# Patient Record
Sex: MALE | Race: WHITE | NOT HISPANIC OR LATINO
[De-identification: names, ages, dates, MRNs, and addresses within clinical notes are randomized per-mention and may not be internally consistent; named-entity substitution may affect disease eponyms.]

---

## 2020-03-12 PROBLEM — Z00.00 ENCOUNTER FOR PREVENTIVE HEALTH EXAMINATION: Status: ACTIVE | Noted: 2020-03-12

## 2020-06-15 DIAGNOSIS — Z72.89 OTHER PROBLEMS RELATED TO LIFESTYLE: ICD-10-CM

## 2020-06-15 DIAGNOSIS — Z78.9 OTHER SPECIFIED HEALTH STATUS: ICD-10-CM

## 2020-06-15 DIAGNOSIS — Z86.79 PERSONAL HISTORY OF OTHER DISEASES OF THE CIRCULATORY SYSTEM: ICD-10-CM

## 2020-06-16 ENCOUNTER — APPOINTMENT (OUTPATIENT)
Dept: CARDIOLOGY | Facility: CLINIC | Age: 75
End: 2020-06-16
Payer: MEDICARE

## 2020-06-16 ENCOUNTER — NON-APPOINTMENT (OUTPATIENT)
Age: 75
End: 2020-06-16

## 2020-06-16 VITALS
HEART RATE: 82 BPM | WEIGHT: 190 LBS | HEIGHT: 71 IN | RESPIRATION RATE: 16 BRPM | TEMPERATURE: 98 F | DIASTOLIC BLOOD PRESSURE: 70 MMHG | OXYGEN SATURATION: 99 % | SYSTOLIC BLOOD PRESSURE: 124 MMHG | BODY MASS INDEX: 26.6 KG/M2

## 2020-06-16 PROCEDURE — 93000 ELECTROCARDIOGRAM COMPLETE: CPT

## 2020-06-16 PROCEDURE — 99203 OFFICE O/P NEW LOW 30 MIN: CPT

## 2020-06-16 NOTE — HISTORY OF PRESENT ILLNESS
[FreeTextEntry1] : The patient is seen because of hypertension.  The patient developed hypertension in his 60s.  He continues to have mild hypertension.  He believes it was from anxiety that his hypertension seems slightly worse recently.  He was started on pharmacologic therapy.  The patient is well controlled.  The patient is entirely asymptomatic.  The patient exercises up to 30 minutes at a time with out exertional symptoms.  There are no significant other related problems.

## 2020-06-16 NOTE — ASSESSMENT
[FreeTextEntry1] : Benign essential hypertension: Continue current pharmacologic therapy.  Basic metabolic panel on ACE inhibition has been arranged.\par \par The risks and benefits of further investigation have been reviewed.  Overall elected to continue current pharmacologic therapy.

## 2020-06-16 NOTE — PHYSICAL EXAM
[General Appearance - Well Developed] : well developed [Normal Appearance] : normal appearance [Well Groomed] : well groomed [General Appearance - Well Nourished] : well nourished [No Deformities] : no deformities [General Appearance - In No Acute Distress] : no acute distress [Normal Conjunctiva] : the conjunctiva exhibited no abnormalities [Eyelids - No Xanthelasma] : the eyelids demonstrated no xanthelasmas [Normal Oral Mucosa] : normal oral mucosa [No Oral Pallor] : no oral pallor [No Oral Cyanosis] : no oral cyanosis [Normal Jugular Venous V Waves Present] : normal jugular venous V waves present [Normal Jugular Venous A Waves Present] : normal jugular venous A waves present [Heart Rate And Rhythm] : heart rate and rhythm were normal [Heart Sounds] : normal S1 and S2 [No Jugular Venous Alexander A Waves] : no jugular venous alexander A waves [Murmurs] : no murmurs present [Exaggerated Use Of Accessory Muscles For Inspiration] : no accessory muscle use [Respiration, Rhythm And Depth] : normal respiratory rhythm and effort [Abdomen Tenderness] : non-tender [Abdomen Soft] : soft [Auscultation Breath Sounds / Voice Sounds] : lungs were clear to auscultation bilaterally [Abdomen Mass (___ Cm)] : no abdominal mass palpated [Abnormal Walk] : normal gait [Nail Clubbing] : no clubbing of the fingernails [Gait - Sufficient For Exercise Testing] : the gait was sufficient for exercise testing [Petechial Hemorrhages (___cm)] : no petechial hemorrhages [Cyanosis, Localized] : no localized cyanosis [Skin Color & Pigmentation] : normal skin color and pigmentation [No Venous Stasis] : no venous stasis [] : no rash [Skin Lesions] : no skin lesions [No Skin Ulcers] : no skin ulcer [No Xanthoma] : no  xanthoma was observed [Affect] : the affect was normal [Oriented To Time, Place, And Person] : oriented to person, place, and time [Mood] : the mood was normal [No Anxiety] : not feeling anxious

## 2020-09-19 ENCOUNTER — TRANSCRIPTION ENCOUNTER (OUTPATIENT)
Age: 75
End: 2020-09-19

## 2022-05-26 ENCOUNTER — APPOINTMENT (OUTPATIENT)
Dept: CARDIOLOGY | Facility: CLINIC | Age: 77
End: 2022-05-26
Payer: MEDICARE

## 2022-05-26 ENCOUNTER — NON-APPOINTMENT (OUTPATIENT)
Age: 77
End: 2022-05-26

## 2022-05-26 VITALS
WEIGHT: 185 LBS | DIASTOLIC BLOOD PRESSURE: 64 MMHG | SYSTOLIC BLOOD PRESSURE: 128 MMHG | HEART RATE: 91 BPM | BODY MASS INDEX: 25.9 KG/M2 | OXYGEN SATURATION: 95 % | HEIGHT: 71 IN | TEMPERATURE: 97.3 F

## 2022-05-26 PROCEDURE — 99213 OFFICE O/P EST LOW 20 MIN: CPT

## 2022-05-26 PROCEDURE — 93000 ELECTROCARDIOGRAM COMPLETE: CPT

## 2022-05-26 NOTE — ASSESSMENT
[FreeTextEntry1] : The patient is entirely asymptomatic.  He exercised for 2030 minutes without exertional symptoms.\par \par Hypertension: Well-controlled.  Need for basic metabolic panel has been reviewed.  The patient verbalizes an understanding.  Lisinopril renewed.

## 2022-08-25 ENCOUNTER — NON-APPOINTMENT (OUTPATIENT)
Age: 77
End: 2022-08-25

## 2022-09-16 ENCOUNTER — APPOINTMENT (OUTPATIENT)
Dept: CARDIOLOGY | Facility: CLINIC | Age: 77
End: 2022-09-16

## 2022-09-16 ENCOUNTER — NON-APPOINTMENT (OUTPATIENT)
Age: 77
End: 2022-09-16

## 2022-09-16 VITALS
HEIGHT: 71 IN | WEIGHT: 176 LBS | BODY MASS INDEX: 24.64 KG/M2 | DIASTOLIC BLOOD PRESSURE: 72 MMHG | HEART RATE: 133 BPM | SYSTOLIC BLOOD PRESSURE: 114 MMHG | OXYGEN SATURATION: 99 %

## 2022-09-16 VITALS — SYSTOLIC BLOOD PRESSURE: 118 MMHG | DIASTOLIC BLOOD PRESSURE: 72 MMHG

## 2022-09-16 PROCEDURE — 99215 OFFICE O/P EST HI 40 MIN: CPT

## 2022-09-16 PROCEDURE — 93000 ELECTROCARDIOGRAM COMPLETE: CPT

## 2022-09-16 RX ORDER — METOPROLOL TARTRATE 50 MG/1
50 TABLET, FILM COATED ORAL DAILY
Refills: 0 | Status: DISCONTINUED | COMMUNITY
Start: 2022-09-16 | End: 2022-09-16

## 2022-09-16 RX ORDER — LISINOPRIL 5 MG/1
5 TABLET ORAL DAILY
Qty: 90 | Refills: 0 | Status: COMPLETED | COMMUNITY
Start: 1900-01-01 | End: 2022-09-16

## 2022-09-16 RX ORDER — DIGOXIN 250 UG/1
250 TABLET ORAL DAILY
Qty: 30 | Refills: 1 | Status: DISCONTINUED | COMMUNITY
Start: 2022-09-16 | End: 2022-09-16

## 2022-09-16 NOTE — HISTORY OF PRESENT ILLNESS
[FreeTextEntry1] : Medical history mainly significant for\par Essential hypertension\par Atrial flutter.  S/p INDIRA guided ablation.  August 26, 2020

## 2022-09-16 NOTE — DISCUSSION/SUMMARY
[FreeTextEntry1] : 76-year-old gentleman with above medical history active medical problems which includes\par 1.  Recurrent atrial flutter with rapid ventricular rate after recent INDIRA guided cardioversion on August 26, 2022 hospital admission.  He is [id number] 2 score is 3.\par Pathophysiology of atrial flutter/fibrillation reviewed with them.\par Risk benefits alternatives of anticoagulation discussed.\par Complicating factor is renal stent and renal stone disease with needs intervention.\par He also has elevated BNP level and moderate dilated left atrium.\par Reviewed role of antiarrhythmic medication at present for better control of rhythm and subsequent consideration regarding atrial flutter/fibrillation ablation.\par Rhythm control versus rate control strategy and anticoagulation management of at least 4 weeks after rhythm control prior to discontinuation for short time of anticoagulation for renal intervention was discussed.\par At present options of hospital admission and urgent management reviewed.  He does not want to pursue.\par Recommended amiodarone to be started 200 mg twice daily.  Risk benefits alternatives side effects reviewed.  Close follow-up on ophthalmology, pulmonary function test, thyroid liver if chronic and long-term use.\par Metoprolol 75 mg twice daily.\par Continue Eliquis.\par Plan INDIRA guided cardioversion in next 5 to 7 days.\par Followed by 4 weeks of anticoagulation and subsequent renal stent procedure.\par At the time we can hold anticoagulation for short period of time.  As needed use of low molecular weight heparin.\par And subsequent atrial flutter ablation.\par Any change in clinical status they understand to call 911 and go to the nearest emergency room\par Risk benefits alternatives side effects limitation of all these options were reviewed.\par Understands best option would be to be admitted this week and and further management.\par #2 essential hypertension.  Very well controlled.  Continue present regimen medication\par 3.  Elevated BNP.  Trivial pericardial effusion.  Moderate mitral regurgitation.  Wall motion abnormality.  Once stable consider ischemic evaluation.\par At present no signs of unstable CAD.\par \par Counseling regarding low saturated fat, salt and carbohydrate intake was reviewed. Active lifestyle and regular. Exercise along with weight management is advised.\par All the above were at length reviewed. Answered all the questions. Thank you very much for this kind referral. Please do not hesitate to give me a call for any question.\par Part of this transcription was done with voice recognition software and phonetically similar errors are common. I apologize for that. Please do not hesitate to call for any questions due to above.\par \par Sincerely,\par Karolina Gonsales MD,FACC,FASE\par

## 2022-09-16 NOTE — ASSESSMENT
[FreeTextEntry1] : Reviewed on September 16, 2022\par EKG echocardiogram INDIRA labs were reviewed.\par TSH was 2.14\par N-terminal proBNP was 4862.  Troponins were negative CBC BMP stable though the creatinine 1.53 with GFR around 47

## 2022-09-16 NOTE — REASON FOR VISIT
[Symptom and Test Evaluation] : symptom and test evaluation [Arrhythmia/ECG Abnorrmalities] : arrhythmia/ECG abnormalities [Hypertension] : hypertension [Spouse] : spouse [FreeTextEntry3] : Dr. Gamez [FreeTextEntry1] : 76-year-old gentleman is referred to me for hospital follow-up after recent extensive stay at [facility].  August 26, 2022.  I have reviewed data which includes cardiology consultation echocardiogram EKGs labs.\par He was admitted with atrial flutter rapid ventricular rate.  He had a transesophageal echo guided cardioversion.  \par Subsequently seen in your office again with atrial flutter recurrence.  Now comes in for further evaluation management.  He has no symptoms.\par His main concern is renal stone disease recent renal stent.  Intermittent symptoms related to that.  Being followed with urologist.\par I recommended discussion with the urologist regarding further management.\par

## 2022-09-16 NOTE — CARDIOLOGY SUMMARY
[de-identified] : May 26, 2022.  Normal sinus rhythm\par August 26, 2022.  Atrial flutter.  221 block.  August 28, 2022 normal sinus rhythm.  Left atrial abnormality. [de-identified] : August 27, 2022.  LVEF 55%.  Segmental wall motion abnormality.  Moderate LAE.  Dilated ascending aorta 4.1 cm mild to moderate AR mild to moderate mitral regurgitation.  Trivial circumferential pericardial effusion.\par INDIRA.  August 26, 2022.  Moderately dilated left atrium.  Moderate MR.  LVEF 55%

## 2022-09-19 ENCOUNTER — NON-APPOINTMENT (OUTPATIENT)
Age: 77
End: 2022-09-19

## 2022-09-23 LAB — SARS-COV-2 N GENE NPH QL NAA+PROBE: NOT DETECTED

## 2022-09-26 ENCOUNTER — APPOINTMENT (OUTPATIENT)
Dept: CARDIOLOGY | Facility: CLINIC | Age: 77
End: 2022-09-26

## 2022-09-26 ENCOUNTER — NON-APPOINTMENT (OUTPATIENT)
Age: 77
End: 2022-09-26

## 2022-09-26 VITALS
HEIGHT: 71 IN | OXYGEN SATURATION: 98 % | HEART RATE: 75 BPM | DIASTOLIC BLOOD PRESSURE: 62 MMHG | SYSTOLIC BLOOD PRESSURE: 104 MMHG | BODY MASS INDEX: 24.92 KG/M2 | WEIGHT: 178 LBS

## 2022-09-26 DIAGNOSIS — Z87.442 PERSONAL HISTORY OF URINARY CALCULI: ICD-10-CM

## 2022-09-26 PROCEDURE — 99214 OFFICE O/P EST MOD 30 MIN: CPT

## 2022-09-26 PROCEDURE — 93000 ELECTROCARDIOGRAM COMPLETE: CPT

## 2022-09-26 NOTE — DISCUSSION/SUMMARY
[FreeTextEntry1] : 76-year-old gentleman with above medical history active medical problems which includes\par 1.  Paroxysmal atrial flutter with rapid ventricular rate after recent INDIRA guided cardioversion on August 26, 2022 hospital admission.  He is [id number] 2 score is 3.\par Pathophysiology of atrial flutter/fibrillation reviewed with them.\par Risk benefits alternatives of anticoagulation discussed.\par He also has elevated BNP level and moderate dilated left atrium.\par Now back in normal sinus rhythm.\par Decrease amiodarone to 200 mg.  Follow liver function thyroid panel ophthalmic checkup PFTs regularly when amiodarone is continued in future.\par Decrease metoprolol to 50 mg twice daily\par Reviewed rhythm control with atrial flutter ablation going forward.\par He will make up his mind regarding which electrophysiologist he wants to see as he has been talking to his friends and family.  I have given him options at [facility] cardiology practice locally\par Continue Eliquis.\par Recommended to see urologist at this point.  Plan for renal intervention in about 3 weeks.  At that time anticoagulation can be stopped for 48 hours.  And restart as soon as possible after the intervention.  Followed by consideration regarding ablation after at least 4 weeks of anticoagulation\par Any change in clinical status they understand to call 911 and go to the nearest emergency room\par Risk benefits alternatives side effects limitation of all these options were reviewed.\par Understands best option would be to be admitted this week and and further management.\par #2 essential hypertension.  Very well controlled.  Continue present regimen medication\par 3.  Elevated BNP.  Trivial pericardial effusion.  Moderate mitral regurgitation.  Wall motion abnormality. \par Ischemic evaluation with nuclear myocardial perfusion scan.  Risk benefits alternatives reviewed.  Technetium 99 radioisotope use was reviewed.  Risks, benefits, alternatives of use of radioisotope was discussed at length.  Patient verbalized understanding and agreed with the management plan.\par If unable to achieve the goal will need pharmacological means\par \par Counseling regarding low saturated fat, salt and carbohydrate intake was reviewed. Active lifestyle and regular. Exercise along with weight management is advised.\par All the above were at length reviewed. Answered all the questions. Thank you very much for this kind referral. Please do not hesitate to give me a call for any question.\par Part of this transcription was done with voice recognition software and phonetically similar errors are common. I apologize for that. Please do not hesitate to call for any questions due to above.\par \par Sincerely,\par Karolina Gonsales MD,FACC,FASE\par

## 2022-09-26 NOTE — CARDIOLOGY SUMMARY
[de-identified] : May 26, 2022.  Normal sinus rhythm\par August 26, 2022.  Atrial flutter.  221 block.  August 28, 2022 normal sinus rhythm.  Left atrial abnormality.\par September 26, 2022.  Normal sinus [de-identified] : August 27, 2022.  LVEF 55%.  Segmental wall motion abnormality.  Moderate LAE.  Dilated ascending aorta 4.1 cm mild to moderate AR mild to moderate mitral regurgitation.  Trivial circumferential pericardial effusion.\par INDIRA.  August 26, 2022.  Moderately dilated left atrium.  Moderate MR.  LVEF 55%

## 2022-09-26 NOTE — REASON FOR VISIT
[Symptom and Test Evaluation] : symptom and test evaluation [Arrhythmia/ECG Abnorrmalities] : arrhythmia/ECG abnormalities [Hypertension] : hypertension [Spouse] : spouse [FreeTextEntry3] : Dr. Gamez [FreeTextEntry1] : 76-year-old gentleman comes in with his wife for follow-up consultation after recent hospital admission for INDIRA/cardioversion.  He was noted in normal sinus rhythm prior to the procedure and procedure was canceled.  He has been maintained on amiodarone metoprolol Eliquis.\par As you know recently he was admitted to [facility] in August\par with atrial flutter rapid ventricular rate.  He had a transesophageal echo guided cardioversion.  \par Subsequently seen in your office again with atrial flutter recurrence.  Now comes in for further evaluation management.  He has no symptoms.\par His main concern is renal stone disease recent renal stent.  Intermittent symptoms related to that.  Being followed with urologist.\par He is here now to plan for urological procedure and further management of his atrial flutter\par

## 2022-09-26 NOTE — ASSESSMENT
[FreeTextEntry1] : Reviewed on September 16, 2022\par EKG echocardiogram INDIRA labs were reviewed.\par TSH was 2.14\par N-terminal proBNP was 4862.  Troponins were negative CBC BMP stable though the creatinine 1.53 with GFR around 47\par \par Reviewed on September 26, 2022.\par EKG as noted above

## 2022-09-29 ENCOUNTER — APPOINTMENT (OUTPATIENT)
Dept: CARDIOLOGY | Facility: CLINIC | Age: 77
End: 2022-09-29

## 2022-09-29 ENCOUNTER — NON-APPOINTMENT (OUTPATIENT)
Age: 77
End: 2022-09-29

## 2022-09-29 PROCEDURE — 78452 HT MUSCLE IMAGE SPECT MULT: CPT

## 2022-09-29 PROCEDURE — 93015 CV STRESS TEST SUPVJ I&R: CPT

## 2022-09-29 PROCEDURE — A9502: CPT

## 2022-10-03 ENCOUNTER — NON-APPOINTMENT (OUTPATIENT)
Age: 77
End: 2022-10-03

## 2022-10-03 ENCOUNTER — APPOINTMENT (OUTPATIENT)
Dept: CARDIOLOGY | Facility: CLINIC | Age: 77
End: 2022-10-03

## 2022-10-11 ENCOUNTER — NON-APPOINTMENT (OUTPATIENT)
Age: 77
End: 2022-10-11

## 2022-10-11 ENCOUNTER — APPOINTMENT (OUTPATIENT)
Dept: CARDIOLOGY | Facility: CLINIC | Age: 77
End: 2022-10-11

## 2022-10-11 VITALS
BODY MASS INDEX: 25.62 KG/M2 | HEIGHT: 71 IN | SYSTOLIC BLOOD PRESSURE: 124 MMHG | DIASTOLIC BLOOD PRESSURE: 60 MMHG | WEIGHT: 183 LBS | OXYGEN SATURATION: 100 % | HEART RATE: 71 BPM

## 2022-10-11 DIAGNOSIS — R00.0 TACHYCARDIA, UNSPECIFIED: ICD-10-CM

## 2022-10-11 DIAGNOSIS — Z01.810 ENCOUNTER FOR PREPROCEDURAL CARDIOVASCULAR EXAMINATION: ICD-10-CM

## 2022-10-11 PROCEDURE — 93000 ELECTROCARDIOGRAM COMPLETE: CPT | Mod: NC

## 2022-10-11 PROCEDURE — 99215 OFFICE O/P EST HI 40 MIN: CPT

## 2022-10-11 RX ORDER — PANTOPRAZOLE 40 MG/1
40 TABLET, DELAYED RELEASE ORAL DAILY
Refills: 0 | Status: DISCONTINUED | COMMUNITY
Start: 2022-09-16 | End: 2022-10-11

## 2022-10-11 NOTE — DISCUSSION/SUMMARY
[FreeTextEntry1] : 76-year-old gentleman with above medical history active medical problems which includes\par 1.  Paroxysmal atrial flutter/fibrillation.  Rhythm control with INDIRA/DCCV followed by recurrence now on low-dose amiodarone remaining in normal sinus rhythm.  [id number] 2 score is 3.\par Pathophysiology of atrial flutter/fibrillation reviewed with them.\par Risk benefits alternatives of anticoagulation discussed.\par Plan rhythm control once renal stone disease management is done.\par Risk benefits alternatives of amiodarone reviewed.  High risk medication in the form of amiodarone and anticoagulation understood.  Follow ophthalmology, liver function, PFTs, TSH on a regular basis if use long-term.  Any side effects he will contact us\par Follow-up for any unusual bleeding.\par Rhythm control with electrophysiologist at [facility]/ Dr. Hendrickson after renal intervention and going back on anticoagulation along with stopping amiodarone few weeks before intervention\par #2 essential hypertension.  Very well controlled.  Continue present regimen medication\par 3.  Elevated BNP.  Trivial pericardial effusion.  Moderate mitral regurgitation.  Wall motion abnormality. \par Stable myocardial perfusion scan reviewed.  We will follow-up after rhythm control.\par 4.  Preoperative cardiac assessment for urological procedure.\par At present, there are no active cardiac conditions.\par No recent unstable coronary syndrome, decompensated heart failure, severe valvular heart disease or significant dysrhythmias.\par The clinical benefit of the proposed procedure outweighs the associated cardiovascular risk.\par Risk not attenuated with further cardiovascular testing.\par Prior testing as outlined above.\par Optimized from a cardiovascular perspective.\par Control blood pressure, heart rate, pulse oximetry perioperatively.\par If required appropriate intravenous medication for the outpatient oral medication for blood pressure, heart rate control.\par DVT prophylaxis as per indication.\par \par For surgery and invasive procedures, recommend to discontinue apixaban at least 48 hours prior to elective surgery or invasive procedures with a moderate-to-high risk of clinically significant bleeding. Anticoagulation bridging during the 48 hours apixaban is interrupted and prior to the intervention is not generally required. Reinitiate apixaban when adequate hemostasis has been achieved.  If oral therapy cannot be administered, then consider administration of a parenteral anticoagulant. Note excess discontinuation of any oral anticoagulant, including apixaban, increases the risk of thrombotic events. Patient was counseled and verbalizes understanding of these associated risks\par \par \par Counseling regarding low saturated fat, salt and carbohydrate intake was reviewed. Active lifestyle and regular. Exercise along with weight management is advised.\par All the above were at length reviewed. Answered all the questions. Thank you very much for this kind referral. Please do not hesitate to give me a call for any question.\par Part of this transcription was done with voice recognition software and phonetically similar errors are common. I apologize for that. Please do not hesitate to call for any questions due to above.\par \par Sincerely,\par Karolina Gonsales MD,FACC,FASE\par

## 2022-10-11 NOTE — ASSESSMENT
[FreeTextEntry1] : Reviewed on September 16, 2022\par EKG echocardiogram INDIRA labs were reviewed.\par TSH was 2.14\par N-terminal proBNP was 4862.  Troponins were negative CBC BMP stable though the creatinine 1.53 with GFR around 47\par \par Reviewed on September 26, 2022.\par EKG as noted above\par \par Reviewed on October 11, 2022\par Nuclear myocardial perfusion scan/EKG reviewed with them.

## 2022-10-11 NOTE — REASON FOR VISIT
[Symptom and Test Evaluation] : symptom and test evaluation [Arrhythmia/ECG Abnorrmalities] : arrhythmia/ECG abnormalities [Hypertension] : hypertension [Spouse] : spouse [FreeTextEntry3] : Dr. Gamez [FreeTextEntry1] : 76-year-old gentleman comes in with his wife for preoperative cardiac assessment, review of multiple cardiovascular tests.  I had discussion with his electrophysiologist and urologist to set up management of renal stone disease ureteric stent and subsequent A. fib/a flutter rhythm management.\par He has been maintained in normal sinus rhythm with amiodarone metoprolol Eliquis.\par As you know recently he was admitted to [facility] in August\par with atrial flutter rapid ventricular rate.  He had a transesophageal echo guided cardioversion.  \par Subsequently seen in your office again with atrial flutter recurrence.  Subsequently starting on amiodarone he has remained in normal sinus rhythm except during stress test he went into atrial fibrillation.\par At present he offers no chest pain no significant shortness of breath PND orthopnea palpitation dizziness lightheadedness near syncopal syncopal event\par No bleeding.\par Good activity and exercise\par

## 2022-10-11 NOTE — PHYSICAL EXAM
[Normal] : normal gait [Normal Gait] : normal gait [No Edema] : no edema [No Cyanosis] : no cyanosis [No Clubbing] : no clubbing [Normal Radial B/L] : normal radial B/L [de-identified] : Regular S1-S2 no significant murmur gallop or rub. [de-identified] : Warm extremities

## 2022-10-11 NOTE — CARDIOLOGY SUMMARY
[de-identified] : May 26, 2022.  Normal sinus rhythm\par August 26, 2022.  Atrial flutter.  221 block.  August 28, 2022 normal sinus rhythm.  Left atrial abnormality.\par September 26, 2022.  Normal sinus\par October 11, 2022 normal sinus rhythm normal intervals [de-identified] : Nuclear myocardial perfusion scan September 29, 2022.  5 minutes 33 seconds of Andrew protocol 7 METs of workload.  Atrial fibrillation with RVR at the peak.  Perfusion scan without any evidence of ischemia or infarction [de-identified] : August 27, 2022.  LVEF 55%.  Segmental wall motion abnormality.  Moderate LAE.  Dilated ascending aorta 4.1 cm mild to moderate AR mild to moderate mitral regurgitation.  Trivial circumferential pericardial effusion.\par INDIRA.  August 26, 2022.  Moderately dilated left atrium.  Moderate MR.  LVEF 55%

## 2022-10-17 ENCOUNTER — RX RENEWAL (OUTPATIENT)
Age: 77
End: 2022-10-17

## 2022-11-03 ENCOUNTER — RX RENEWAL (OUTPATIENT)
Age: 77
End: 2022-11-03

## 2022-11-28 ENCOUNTER — APPOINTMENT (OUTPATIENT)
Dept: CARDIOLOGY | Facility: CLINIC | Age: 77
End: 2022-11-28
Payer: MEDICARE

## 2022-11-28 VITALS
BODY MASS INDEX: 25.9 KG/M2 | HEART RATE: 75 BPM | SYSTOLIC BLOOD PRESSURE: 128 MMHG | WEIGHT: 185 LBS | RESPIRATION RATE: 14 BRPM | TEMPERATURE: 97.7 F | HEIGHT: 71 IN | OXYGEN SATURATION: 99 % | DIASTOLIC BLOOD PRESSURE: 66 MMHG

## 2022-11-28 DIAGNOSIS — I48.92 UNSPECIFIED ATRIAL FLUTTER: ICD-10-CM

## 2022-11-28 PROCEDURE — 99214 OFFICE O/P EST MOD 30 MIN: CPT

## 2022-11-28 RX ORDER — METOPROLOL TARTRATE 75 MG/1
75 TABLET, FILM COATED ORAL
Qty: 180 | Refills: 0 | Status: DISCONTINUED | COMMUNITY
Start: 2022-09-16 | End: 2022-11-28

## 2022-11-28 RX ORDER — AMIODARONE HYDROCHLORIDE 200 MG/1
200 TABLET ORAL
Qty: 90 | Refills: 0 | Status: DISCONTINUED | COMMUNITY
Start: 2022-09-16 | End: 2022-11-28

## 2022-11-28 NOTE — DISCUSSION/SUMMARY
[FreeTextEntry1] : 76-year-old gentleman with above medical history active medical problems which includes\par 1.  Paroxysmal atrial flutter/fibrillation.  Rhythm control with INDIRA/DCCV followed by recurrence now on low-dose amiodarone remaining in normal sinus rhythm.  [id number] 2 score is 3.\par Pathophysiology of atrial flutter/fibrillation reviewed with them.\par Risk benefits alternatives of anticoagulation discussed.\par Plan rhythm control once renal stone disease management is done.  No need to adjust sotalol based on renal function.  Repeat labs as ordered to be done.\par Anemia evaluation management also needs to be done.  There is no overt sign of bleeding\par Follow-up for any unusual bleeding.\par #2 essential hypertension.  Very well controlled.  Continue present regimen medication\par 3.  Elevated BNP.  Trivial pericardial effusion.  Moderate mitral regurgitation.  Wall motion abnormality. \par Stable myocardial perfusion scan reviewed.  We will follow-up after rhythm control.\par #4 high risk medication use.  Careful and close follow-up in presence of anemia or renal insufficiency.  EKG check as advised by Dr. Hendrickson on sotalol.\par Adjustment of the sotalol dose based on renal function\par \par Counseling regarding low saturated fat, salt and carbohydrate intake was reviewed. Active lifestyle and regular. Exercise along with weight management is advised.\par All the above were at length reviewed. Answered all the questions. Thank you very much for this kind referral. Please do not hesitate to give me a call for any question.\par Part of this transcription was done with voice recognition software and phonetically similar errors are common. I apologize for that. Please do not hesitate to call for any questions due to above.\par \par Sincerely,\par Karolina Gonsales MD,FACC,LATOYA\par

## 2022-11-28 NOTE — ASSESSMENT
[FreeTextEntry1] : Reviewed on September 16, 2022\par EKG echocardiogram INDIRA labs were reviewed.\par TSH was 2.14\par N-terminal proBNP was 4862.  Troponins were negative CBC BMP stable though the creatinine 1.53 with GFR around 47\par \par Reviewed on September 26, 2022.\par EKG as noted above\par \par Reviewed on October 11, 2022\par Nuclear myocardial perfusion scan/EKG reviewed with them.\par \par Reviewed November 28, 2022.\par Recent labs from November had shown hemoglobin 8.7 sodium 137 potassium 4.7 creatinine 2.73

## 2022-11-28 NOTE — REASON FOR VISIT
[Symptom and Test Evaluation] : symptom and test evaluation [Arrhythmia/ECG Abnorrmalities] : arrhythmia/ECG abnormalities [Hypertension] : hypertension [Spouse] : spouse [FreeTextEntry3] : Dr. Gamez [FreeTextEntry1] : 76-year-old gentleman comes in with his wife for preoperative cardiac assessment, review of multiple cardiovascular tests.  I had discussion with his electrophysiologist and urologist to set up management of renal stone disease ureteric stent and subsequent A. fib/a flutter rhythm management.\par He has been maintained in normal sinus rhythm with amiodarone metoprolol Eliquis.\par As you know recently he was admitted to [facility] in August\par with atrial flutter rapid ventricular rate.  He had a transesophageal echo guided cardioversion.  \par Subsequently seen in your office again with atrial flutter recurrence.  Subsequently starting on amiodarone he has remained in normal sinus rhythm except during stress test he went into atrial fibrillation.\par Subsequently it was decided to continue with amiodarone low dose.  He was seen by electrophysiologist.  Now to be started on sotalol.\par He also had his renal stone management and retrieval of the stent.\par He was noted to have renal insufficiency and anemia around the time of procedure.\par \par At present he offers no chest pain no significant shortness of breath PND orthopnea palpitation dizziness lightheadedness near syncopal syncopal event\par No bleeding.\par Good activity and exercise\par

## 2022-11-28 NOTE — CARDIOLOGY SUMMARY
[de-identified] : May 26, 2022.  Normal sinus rhythm\par August 26, 2022.  Atrial flutter.  221 block.  August 28, 2022 normal sinus rhythm.  Left atrial abnormality.\par September 26, 2022.  Normal sinus\par October 11, 2022 normal sinus rhythm normal intervals [de-identified] : Nuclear myocardial perfusion scan September 29, 2022.  5 minutes 33 seconds of Andrew protocol 7 METs of workload.  Atrial fibrillation with RVR at the peak.  Perfusion scan without any evidence of ischemia or infarction [de-identified] : August 27, 2022.  LVEF 55%.  Segmental wall motion abnormality.  Moderate LAE.  Dilated ascending aorta 4.1 cm mild to moderate AR mild to moderate mitral regurgitation.  Trivial circumferential pericardial effusion.\par INDIRA.  August 26, 2022.  Moderately dilated left atrium.  Moderate MR.  LVEF 55%

## 2022-11-28 NOTE — PHYSICAL EXAM
[Normal] : normal gait [Normal Gait] : normal gait [No Edema] : no edema [No Cyanosis] : no cyanosis [No Clubbing] : no clubbing [Normal Radial B/L] : normal radial B/L [de-identified] : Regular S1-S2 no significant murmur gallop or rub. [de-identified] : Warm extremities

## 2022-12-01 DIAGNOSIS — D64.9 ANEMIA, UNSPECIFIED: ICD-10-CM

## 2022-12-04 ENCOUNTER — RESULT CHARGE (OUTPATIENT)
Age: 77
End: 2022-12-04

## 2022-12-05 ENCOUNTER — APPOINTMENT (OUTPATIENT)
Dept: CARDIOLOGY | Facility: CLINIC | Age: 77
End: 2022-12-05

## 2022-12-05 ENCOUNTER — NON-APPOINTMENT (OUTPATIENT)
Age: 77
End: 2022-12-05

## 2022-12-05 VITALS
DIASTOLIC BLOOD PRESSURE: 70 MMHG | OXYGEN SATURATION: 99 % | WEIGHT: 184 LBS | TEMPERATURE: 97.8 F | SYSTOLIC BLOOD PRESSURE: 140 MMHG | HEART RATE: 68 BPM | BODY MASS INDEX: 25.66 KG/M2

## 2022-12-05 PROCEDURE — 99214 OFFICE O/P EST MOD 30 MIN: CPT

## 2022-12-05 PROCEDURE — 93000 ELECTROCARDIOGRAM COMPLETE: CPT

## 2022-12-05 NOTE — HISTORY OF PRESENT ILLNESS
[FreeTextEntry1] : GET TRAN  is a 77 year M  who presents today Dec 05, 2022 in clinical follow-up. \par Overall he has been feeling well. There has been no recent illness or hospital stay. Medications have remained unchanged. Asymptomatic from cardiovascular and arrhythmia standpoint. \par Following with Dr. Haas from EP standpoint. Scheduled to start Sotalolol tomorrow although due to renal insufficiency recommend following with Dr. Haas prior to starting. I have contacted his office and left a message. \par Maintaining NSR by EKG today. \par \par Today he denies chest pain, pressure, unusual shortness of breath, lightheadedness, dizziness, near syncope or syncope. \par \par Medical history mainly significant for\par Essential hypertension\par Atrial flutter.  S/p INDIRA guided ablation.  August 26, 2020

## 2022-12-05 NOTE — REASON FOR VISIT
[Symptom and Test Evaluation] : symptom and test evaluation [Arrhythmia/ECG Abnorrmalities] : arrhythmia/ECG abnormalities [Hypertension] : hypertension [Spouse] : spouse [FreeTextEntry3] : Dr. Gamez [FreeTextEntry1] : \par

## 2022-12-05 NOTE — PHYSICAL EXAM
[Normal] : normal gait [Normal Gait] : normal gait [No Edema] : no edema [No Cyanosis] : no cyanosis [No Clubbing] : no clubbing [Normal Radial B/L] : normal radial B/L [de-identified] : Regular S1-S2 no significant murmur gallop or rub. [de-identified] : Warm extremities

## 2022-12-05 NOTE — DISCUSSION/SUMMARY
[FreeTextEntry1] : GET TRAN  is a 77 year M  who presents today Dec 05, 2022 with the above history and the following active issues. \par \par Paroxysmal atrial flutter/fibrillation.   [id number] 2 score is 3.\par Pathophysiology of atrial flutter/fibrillation reviewed with them.\par Risk benefits alternatives of anticoagulation discussed.\par Anemia evaluation management also needs to be done.\par Renal insufficiency. Following with Dr. Haas regarding upcoming plan. \par \par Hypertension.  Very well controlled.  Continue present regimen medication\par \par Elevated BNP.  Trivial pericardial effusion.  Moderate mitral regurgitation.  Wall motion abnormality. \par Stable myocardial perfusion scan reviewed.  We will follow-up after rhythm control.\par \par Red flag symptoms which would warrant sooner emergent evaluation reviewed with the patient. \par Questions and concerns were addressed and answered.\par Limitations of non-invasive testing reviewed\par \par Sincerely,\par \par Jamaica Duron PA-C\par Patients history, testing and plan reviewed with supervising MD: Dr. Karolina Gonsales

## 2022-12-05 NOTE — CARDIOLOGY SUMMARY
[de-identified] : May 26, 2022.  Normal sinus rhythm\par August 26, 2022.  Atrial flutter.  221 block.  August 28, 2022 normal sinus rhythm.  Left atrial abnormality.\par September 26, 2022.  Normal sinus\par October 11, 2022 normal sinus rhythm normal intervals [de-identified] : Nuclear myocardial perfusion scan September 29, 2022.  5 minutes 33 seconds of Andrew protocol 7 METs of workload.  Atrial fibrillation with RVR at the peak.  Perfusion scan without any evidence of ischemia or infarction [de-identified] : August 27, 2022.  LVEF 55%.  Segmental wall motion abnormality.  Moderate LAE.  Dilated ascending aorta 4.1 cm mild to moderate AR mild to moderate mitral regurgitation.  Trivial circumferential pericardial effusion.\par INDIRA.  August 26, 2022.  Moderately dilated left atrium.  Moderate MR.  LVEF 55%

## 2022-12-07 RX ORDER — SOTALOL HYDROCHLORIDE 80 MG/1
80 TABLET ORAL
Refills: 0 | Status: DISCONTINUED | COMMUNITY
Start: 2022-11-21 | End: 2022-12-07

## 2023-05-08 ENCOUNTER — APPOINTMENT (OUTPATIENT)
Dept: CARDIOLOGY | Facility: CLINIC | Age: 78
End: 2023-05-08
Payer: MEDICARE

## 2023-05-08 VITALS
HEART RATE: 88 BPM | WEIGHT: 186 LBS | DIASTOLIC BLOOD PRESSURE: 78 MMHG | BODY MASS INDEX: 26.04 KG/M2 | HEIGHT: 71 IN | SYSTOLIC BLOOD PRESSURE: 140 MMHG | OXYGEN SATURATION: 97 %

## 2023-05-08 DIAGNOSIS — I49.9 CARDIAC ARRHYTHMIA, UNSPECIFIED: ICD-10-CM

## 2023-05-08 PROCEDURE — 99214 OFFICE O/P EST MOD 30 MIN: CPT

## 2023-05-08 RX ORDER — CHLORHEXIDINE GLUCONATE 4 %
325 (65 FE) LIQUID (ML) TOPICAL
Refills: 0 | Status: DISCONTINUED | COMMUNITY
Start: 2022-09-16 | End: 2023-05-08

## 2023-05-08 RX ORDER — TAMSULOSIN HYDROCHLORIDE 0.4 MG/1
0.4 CAPSULE ORAL
Refills: 0 | Status: DISCONTINUED | COMMUNITY
Start: 2022-09-16 | End: 2023-05-08

## 2023-05-08 NOTE — PHYSICAL EXAM
[Normal] : normal gait [Normal Gait] : normal gait [No Edema] : no edema [No Cyanosis] : no cyanosis [No Clubbing] : no clubbing [Normal Radial B/L] : normal radial B/L [de-identified] : Regular S1-S2 no significant murmur gallop or rub. [de-identified] : Warm extremities

## 2023-05-08 NOTE — HISTORY OF PRESENT ILLNESS
[FreeTextEntry1] : GET TRAN  is a 77 year M  who presents today May 08, 2023 \par Overall he has been feeling well. There has been no recent illness or hospital stay. Medications have remained unchanged. Asymptomatic from cardiovascular and arrhythmia standpoint. \par Following with Dr. Haas from EP standpoint. \par Following with Dr. Kramer from nephrology. \par He had a JORGE after stopping Amiodarone and was not started on Sotalol due renal insufficiency. \par \par Today he denies chest pain, pressure, unusual shortness of breath, lightheadedness, dizziness, near syncope or syncope. \par \par Medical history mainly significant for\par Essential hypertension\par Atrial flutter.  S/p INDIRA guided ablation.  August 26, 2020

## 2023-05-08 NOTE — CARDIOLOGY SUMMARY
[de-identified] : May 26, 2022.  Normal sinus rhythm\par August 26, 2022.  Atrial flutter.  221 block.  August 28, 2022 normal sinus rhythm.  Left atrial abnormality.\par September 26, 2022.  Normal sinus\par October 11, 2022 normal sinus rhythm normal intervals [de-identified] : Nuclear myocardial perfusion scan September 29, 2022.  5 minutes 33 seconds of Andrew protocol 7 METs of workload.  Atrial fibrillation with RVR at the peak.  Perfusion scan without any evidence of ischemia or infarction [de-identified] : August 27, 2022.  LVEF 55%.  Segmental wall motion abnormality.  Moderate LAE.  Dilated ascending aorta 4.1 cm mild to moderate AR mild to moderate mitral regurgitation.  Trivial circumferential pericardial effusion.\par INDIRA.  August 26, 2022.  Moderately dilated left atrium.  Moderate MR.  LVEF 55%

## 2023-05-08 NOTE — DISCUSSION/SUMMARY
[FreeTextEntry1] : GET TRAN  is a 77 year M  who presents today May 08, 2023 with the above history and the following acitve issues. \par \par Paroxysmal atrial flutter/fibrillation.   [id number] 2 score is 3.\par Pathophysiology of atrial flutter/fibrillation reviewed with them.\par Risk benefits alternatives of anticoagulation discussed.\par Anemia evaluation management also needs to be done.\par Renal insufficiency following with Dr. Kramer. Following with Dr. Haas. \par \par Hypertension.  Very well controlled.  Continue present regimen medication.\par Blood pressure on my assessment well controlled 132/64.\par \par Red flag symptoms which would warrant sooner emergent evaluation reviewed with the patient. \par Questions and concerns were addressed and answered.\par Limitations of non-invasive testing reviewed\par \par Sincerely,\par \par Jamaica Duron PA-C\par Patients history, testing and plan reviewed with supervising MD: Dr. Karolina Gonsales

## 2023-06-26 ENCOUNTER — RX RENEWAL (OUTPATIENT)
Age: 78
End: 2023-06-26

## 2023-09-01 ENCOUNTER — NON-APPOINTMENT (OUTPATIENT)
Age: 78
End: 2023-09-01

## 2023-09-18 ENCOUNTER — NON-APPOINTMENT (OUTPATIENT)
Age: 78
End: 2023-09-18

## 2023-09-18 ENCOUNTER — APPOINTMENT (OUTPATIENT)
Dept: CARDIOLOGY | Facility: CLINIC | Age: 78
End: 2023-09-18
Payer: MEDICARE

## 2023-09-18 VITALS
DIASTOLIC BLOOD PRESSURE: 64 MMHG | OXYGEN SATURATION: 100 % | SYSTOLIC BLOOD PRESSURE: 144 MMHG | HEIGHT: 71 IN | BODY MASS INDEX: 25.34 KG/M2 | HEART RATE: 62 BPM | WEIGHT: 181 LBS

## 2023-09-18 DIAGNOSIS — C88.0 WALDENSTROM MACROGLOBULINEMIA: ICD-10-CM

## 2023-09-18 PROCEDURE — 99215 OFFICE O/P EST HI 40 MIN: CPT

## 2023-09-18 PROCEDURE — 93000 ELECTROCARDIOGRAM COMPLETE: CPT

## 2023-09-18 RX ORDER — APIXABAN 5 MG/1
5 TABLET, FILM COATED ORAL
Qty: 180 | Refills: 1 | Status: DISCONTINUED | COMMUNITY
Start: 2023-06-26 | End: 2023-09-18

## 2023-09-18 RX ORDER — PARICALCITOL 2 UG/1
2 CAPSULE ORAL DAILY
Refills: 0 | Status: DISCONTINUED | COMMUNITY
End: 2023-09-18

## 2023-10-11 ENCOUNTER — APPOINTMENT (OUTPATIENT)
Dept: ELECTROPHYSIOLOGY | Facility: CLINIC | Age: 78
End: 2023-10-11

## 2023-10-19 ENCOUNTER — RX RENEWAL (OUTPATIENT)
Age: 78
End: 2023-10-19

## 2023-11-06 ENCOUNTER — APPOINTMENT (OUTPATIENT)
Dept: CARDIOLOGY | Facility: CLINIC | Age: 78
End: 2023-11-06

## 2023-12-18 ENCOUNTER — APPOINTMENT (OUTPATIENT)
Dept: CARDIOLOGY | Facility: CLINIC | Age: 78
End: 2023-12-18
Payer: MEDICARE

## 2023-12-18 VITALS
HEART RATE: 69 BPM | SYSTOLIC BLOOD PRESSURE: 140 MMHG | DIASTOLIC BLOOD PRESSURE: 60 MMHG | BODY MASS INDEX: 26.04 KG/M2 | OXYGEN SATURATION: 99 % | HEIGHT: 71 IN | WEIGHT: 186 LBS

## 2023-12-18 DIAGNOSIS — Z79.01 LONG TERM (CURRENT) USE OF ANTICOAGULANTS: ICD-10-CM

## 2023-12-18 DIAGNOSIS — I48.91 UNSPECIFIED ATRIAL FIBRILLATION: ICD-10-CM

## 2023-12-18 DIAGNOSIS — N28.9 DISORDER OF KIDNEY AND URETER, UNSPECIFIED: ICD-10-CM

## 2023-12-18 DIAGNOSIS — I10 ESSENTIAL (PRIMARY) HYPERTENSION: ICD-10-CM

## 2023-12-18 PROCEDURE — 99214 OFFICE O/P EST MOD 30 MIN: CPT

## 2023-12-18 RX ORDER — APIXABAN 2.5 MG/1
2.5 TABLET, FILM COATED ORAL TWICE DAILY
Refills: 0 | Status: ACTIVE | COMMUNITY

## 2023-12-18 RX ORDER — SULFAMETHOXAZOLE AND TRIMETHOPRIM 400; 80 MG/1; MG/1
400-80 TABLET ORAL
Qty: 28 | Refills: 0 | Status: DISCONTINUED | COMMUNITY
Start: 2023-12-12

## 2023-12-18 RX ORDER — AMLODIPINE BESYLATE 2.5 MG/1
2.5 TABLET ORAL
Qty: 30 | Refills: 0 | Status: DISCONTINUED | COMMUNITY
Start: 2023-12-06

## 2023-12-18 RX ORDER — NITROFURANTOIN (MONOHYDRATE/MACROCRYSTALS) 25; 75 MG/1; MG/1
100 CAPSULE ORAL
Qty: 10 | Refills: 0 | Status: DISCONTINUED | COMMUNITY
Start: 2023-12-12

## 2023-12-18 RX ORDER — METOPROLOL SUCCINATE 50 MG/1
50 TABLET, EXTENDED RELEASE ORAL
Qty: 180 | Refills: 3 | Status: ACTIVE | COMMUNITY
Start: 2022-11-21 | End: 1900-01-01

## 2023-12-18 RX ORDER — ZANUBRUTINIB 80 MG/1
80 CAPSULE, GELATIN COATED ORAL
Qty: 120 | Refills: 0 | Status: ACTIVE | COMMUNITY
Start: 2023-11-28

## 2023-12-18 RX ORDER — CIPROFLOXACIN HYDROCHLORIDE 500 MG/1
500 TABLET, FILM COATED ORAL
Qty: 14 | Refills: 0 | Status: ACTIVE | COMMUNITY
Start: 2023-12-11

## 2023-12-18 NOTE — REASON FOR VISIT
[Arrhythmia/ECG Abnorrmalities] : arrhythmia/ECG abnormalities [Symptom and Test Evaluation] : symptom and test evaluation [Hypertension] : hypertension [Spouse] : spouse [FreeTextEntry3] : Dr. Gamez [FreeTextEntry1] : \par

## 2023-12-18 NOTE — CARDIOLOGY SUMMARY
[de-identified] : May 26, 2022.  Normal sinus rhythm August 26, 2022.  Atrial flutter.  221 block.  August 28, 2022 normal sinus rhythm.  Left atrial abnormality. September 26, 2022.  Normal sinus October 11, 2022 normal sinus rhythm normal intervals September 18, 2023 normal sinus rhythm [de-identified] : Nuclear myocardial perfusion scan September 29, 2022.  5 minutes 33 seconds of Andrew protocol 7 METs of workload.  Atrial fibrillation with RVR at the peak.  Perfusion scan without any evidence of ischemia or infarction [de-identified] : August 27, 2022.  LVEF 55%.  Segmental wall motion abnormality.  Moderate LAE.  Dilated ascending aorta 4.1 cm mild to moderate AR mild to moderate mitral regurgitation.  Trivial circumferential pericardial effusion.\par  INDIRA.  August 26, 2022.  Moderately dilated left atrium.  Moderate MR.  LVEF 55%

## 2023-12-18 NOTE — DISCUSSION/SUMMARY
[FreeTextEntry1] : GET TRAN  is a 78year M  who presents today  with the above history and the following acitve issues.   Paroxysmal atrial flutter/fibrillation.   [id number] 2 score is 3. Pathophysiology of atrial flutter/fibrillation reviewed with them. Risk benefits alternatives of anticoagulation discussed. [place] Tony macroglobulinemia.  Treatment with possible also increased risk of bleeding.  Now on anticoagulation at lower dose.  Discussed with him regarding options of ILR guided anticoagulation/watchman's device Considering increasing bleeding risk.  At present does not want to pursue it.  Understands limitation of evaluation management.  And need to use lower dose of Eliquis because of interaction with his hematology medication. He will contact me if he wants to pursue any of the above evaluation so that we can relatively safely take him off anticoagulation. Renal insufficiency following with Dr. Kramer. Following with Dr. Haas.   Hypertension.  Very well controlled.  Continue present regimen medication in the form of metoprolol. Blood pressure on my assessment well controlled 132/64.  Red flag symptoms which would warrant sooner emergent evaluation reviewed with the patient.  Questions and concerns were addressed and answered. Limitations of non-invasive testing reviewed Counseling regarding low saturated fat, salt and carbohydrate intake was reviewed. Active lifestyle and regular. Exercise along with weight management is advised. All the above were at length reviewed. Answered all the questions. Thank you very much for this kind referral. Please do not hesitate to give me a call for any question. Part of this transcription was done with voice recognition software and phonetically similar errors are common. I apologize for that. Please do not hesitate to call for any questions due to above.   Sincerely,   Karolina Gonsales MD, [facility], [place]

## 2023-12-18 NOTE — PHYSICAL EXAM
[Normal] : clear lung fields, good air entry, no respiratory distress [Normal Gait] : normal gait [No Edema] : no edema [No Cyanosis] : no cyanosis [No Clubbing] : no clubbing [Normal Radial B/L] : normal radial B/L [Normal Speech] : normal speech [Alert and Oriented] : alert and oriented [de-identified] : Regular S1-S2 no significant murmur gallop or rub. [de-identified] : Warm extremities [de-identified] : Mild ecchymosis

## 2023-12-18 NOTE — HISTORY OF PRESENT ILLNESS
[FreeTextEntry1] : GET TRAN  is a 89year M  who presents today Diagnosis of [place] macroglobulinemia.  Has been started on treatment.  As per discussion with hematology it was decided to decrease dose of Eliquis to 2.5 mg twice daily because of increased risk of bleeding with medication. Overall he has been feeling well. There has been no recent illness or hospital stay. Medications have remained unchanged. Asymptomatic from cardiovascular and arrhythmia standpoint.  Following with Dr. Tran from EP standpoint.  Following with Dr. Kramer from nephrology.  He had a JORGE after stopping Amiodarone and was not started on Sotalol due renal insufficiency.   Today he denies chest pain, pressure, unusual shortness of breath, lightheadedness, dizziness, near syncope or syncope.   Medical history mainly significant for Essential hypertension Atrial flutter.  S/p INDIRA guided ablation.  August 26, 2020 [place] Tony macroglobulinemia

## 2024-01-08 PROCEDURE — 0: CUSTOM

## 2024-07-08 ENCOUNTER — APPOINTMENT (OUTPATIENT)
Dept: CARDIOLOGY | Facility: CLINIC | Age: 79
End: 2024-07-08
Payer: MEDICARE

## 2024-07-08 ENCOUNTER — NON-APPOINTMENT (OUTPATIENT)
Age: 79
End: 2024-07-08

## 2024-07-08 VITALS
BODY MASS INDEX: 25.76 KG/M2 | WEIGHT: 184 LBS | SYSTOLIC BLOOD PRESSURE: 140 MMHG | HEART RATE: 66 BPM | DIASTOLIC BLOOD PRESSURE: 62 MMHG | OXYGEN SATURATION: 99 % | HEIGHT: 71 IN

## 2024-07-08 DIAGNOSIS — N28.9 DISORDER OF KIDNEY AND URETER, UNSPECIFIED: ICD-10-CM

## 2024-07-08 DIAGNOSIS — I48.91 UNSPECIFIED ATRIAL FIBRILLATION: ICD-10-CM

## 2024-07-08 DIAGNOSIS — Z79.01 LONG TERM (CURRENT) USE OF ANTICOAGULANTS: ICD-10-CM

## 2024-07-08 DIAGNOSIS — I10 ESSENTIAL (PRIMARY) HYPERTENSION: ICD-10-CM

## 2024-07-08 DIAGNOSIS — I48.92 UNSPECIFIED ATRIAL FLUTTER: ICD-10-CM

## 2024-07-08 PROCEDURE — 99214 OFFICE O/P EST MOD 30 MIN: CPT

## 2024-07-08 PROCEDURE — G2211 COMPLEX E/M VISIT ADD ON: CPT

## 2024-07-08 PROCEDURE — 93000 ELECTROCARDIOGRAM COMPLETE: CPT

## 2024-07-30 ENCOUNTER — APPOINTMENT (OUTPATIENT)
Dept: CARDIOLOGY | Facility: CLINIC | Age: 79
End: 2024-07-30
Payer: MEDICARE

## 2024-07-30 PROCEDURE — 93306 TTE W/DOPPLER COMPLETE: CPT

## 2024-08-05 ENCOUNTER — RX RENEWAL (OUTPATIENT)
Age: 79
End: 2024-08-05

## 2024-12-25 PROBLEM — F10.90 ALCOHOL USE: Status: ACTIVE | Noted: 2020-06-15

## 2025-01-09 ENCOUNTER — APPOINTMENT (OUTPATIENT)
Dept: CARDIOLOGY | Facility: CLINIC | Age: 80
End: 2025-01-09

## 2025-07-17 ENCOUNTER — APPOINTMENT (OUTPATIENT)
Dept: CARDIOLOGY | Facility: CLINIC | Age: 80
End: 2025-07-17
Payer: MEDICARE

## 2025-07-17 ENCOUNTER — NON-APPOINTMENT (OUTPATIENT)
Age: 80
End: 2025-07-17

## 2025-07-17 VITALS
DIASTOLIC BLOOD PRESSURE: 70 MMHG | HEIGHT: 71 IN | OXYGEN SATURATION: 97 % | WEIGHT: 189 LBS | SYSTOLIC BLOOD PRESSURE: 140 MMHG | BODY MASS INDEX: 26.46 KG/M2 | HEART RATE: 70 BPM

## 2025-07-17 PROBLEM — I34.0 NONRHEUMATIC MITRAL (VALVE) INSUFFICIENCY: Status: ACTIVE | Noted: 2025-07-17

## 2025-07-17 PROBLEM — I35.1 NONRHEUMATIC AORTIC VALVE INSUFFICIENCY: Status: ACTIVE | Noted: 2025-07-17

## 2025-07-17 PROCEDURE — 99214 OFFICE O/P EST MOD 30 MIN: CPT

## 2025-07-17 PROCEDURE — G2211 COMPLEX E/M VISIT ADD ON: CPT

## 2025-07-17 PROCEDURE — 93000 ELECTROCARDIOGRAM COMPLETE: CPT

## 2025-08-25 ENCOUNTER — RX RENEWAL (OUTPATIENT)
Age: 80
End: 2025-08-25